# Patient Record
Sex: MALE | Race: WHITE | NOT HISPANIC OR LATINO | Employment: OTHER | URBAN - METROPOLITAN AREA
[De-identification: names, ages, dates, MRNs, and addresses within clinical notes are randomized per-mention and may not be internally consistent; named-entity substitution may affect disease eponyms.]

---

## 2019-09-10 ENCOUNTER — CATARACT CONSULT (OUTPATIENT)
Dept: URBAN - METROPOLITAN AREA CLINIC 43 | Facility: CLINIC | Age: 64
End: 2019-09-10

## 2019-09-10 DIAGNOSIS — H25.811: ICD-10-CM

## 2019-09-10 DIAGNOSIS — H25.812: ICD-10-CM

## 2019-09-10 DIAGNOSIS — H35.413: ICD-10-CM

## 2019-09-10 DIAGNOSIS — H18.51: ICD-10-CM

## 2019-09-10 DIAGNOSIS — H40.013: ICD-10-CM

## 2019-09-10 DIAGNOSIS — H04.123: ICD-10-CM

## 2019-09-10 PROCEDURE — 99204 OFFICE O/P NEW MOD 45 MIN: CPT

## 2019-09-10 PROCEDURE — 92025-2 CORNEAL TOPOGRAPHY, PT

## 2019-09-10 PROCEDURE — 92134 CPTRZ OPH DX IMG PST SGM RTA: CPT

## 2019-09-10 PROCEDURE — V2799PMN IMPRIMIS PRED-MOXI-NEPAF 5ML

## 2019-09-10 PROCEDURE — 92286 ANT SGM IMG I&R SPECLR MIC: CPT

## 2019-09-10 PROCEDURE — 92136TC50 INTERFEROMETRY - TECHNICAL COMPONENT

## 2019-09-10 ASSESSMENT — VISUAL ACUITY
OS_CC: J1
OD_BAT: <20/400
OS_CC: 20/25-3
OS_BAT: <20/400
OD_CC: J8
OD_RAM: 20/20-2
OS_SC: <J12
OS_AM: 20/20-2
OD_CC: 20/50-2
OD_SC: 20/200
OD_SC: <J12
OS_SC: 20/200

## 2019-09-10 ASSESSMENT — TONOMETRY
OD_IOP_MMHG: 15
OS_IOP_MMHG: 15

## 2019-09-19 ENCOUNTER — TECH ONLY (OUTPATIENT)
Dept: URBAN - METROPOLITAN AREA CLINIC 39 | Facility: CLINIC | Age: 64
End: 2019-09-19

## 2019-09-19 DIAGNOSIS — H25.812: ICD-10-CM

## 2019-09-19 DIAGNOSIS — H25.811: ICD-10-CM

## 2019-09-19 PROCEDURE — 92136TCNC INTERFEROMETRY -TECHNICAL COMPONENT NO CHARGE

## 2019-09-23 ENCOUNTER — SURGERY/PROCEDURE (OUTPATIENT)
Dept: URBAN - METROPOLITAN AREA CLINIC 43 | Facility: CLINIC | Age: 64
End: 2019-09-23

## 2019-09-23 ENCOUNTER — PRE-OP/H&P (OUTPATIENT)
Dept: URBAN - METROPOLITAN AREA CLINIC 39 | Facility: CLINIC | Age: 64
End: 2019-09-23

## 2019-09-23 ENCOUNTER — POST-OP (OUTPATIENT)
Dept: URBAN - METROPOLITAN AREA CLINIC 39 | Facility: CLINIC | Age: 64
End: 2019-09-23

## 2019-09-23 DIAGNOSIS — H25.811: ICD-10-CM

## 2019-09-23 DIAGNOSIS — H25.812: ICD-10-CM

## 2019-09-23 DIAGNOSIS — H35.413: ICD-10-CM

## 2019-09-23 DIAGNOSIS — H18.51: ICD-10-CM

## 2019-09-23 DIAGNOSIS — H40.013: ICD-10-CM

## 2019-09-23 DIAGNOSIS — Z96.1: ICD-10-CM

## 2019-09-23 DIAGNOSIS — H04.123: ICD-10-CM

## 2019-09-23 PROCEDURE — 99211T TECH SERVICE

## 2019-09-23 PROCEDURE — 66984AV REMOVE CATARACT, INSERT ADVANCED LENS

## 2019-09-23 PROCEDURE — 66999LNSR LENSAR LASER FOR CAT SX

## 2019-09-23 PROCEDURE — 65772LRI LRI DURING CAT SX

## 2019-09-23 ASSESSMENT — VISUAL ACUITY
OD_PH: 20/60
OD_SC: 20/200

## 2019-09-23 ASSESSMENT — TONOMETRY: OD_IOP_MMHG: 10

## 2019-10-01 ENCOUNTER — POST-OP CATARACT (OUTPATIENT)
Dept: URBAN - METROPOLITAN AREA CLINIC 43 | Facility: CLINIC | Age: 64
End: 2019-10-01

## 2019-10-01 DIAGNOSIS — H25.812: ICD-10-CM

## 2019-10-01 DIAGNOSIS — Z96.1: ICD-10-CM

## 2019-10-01 PROCEDURE — 99024 POSTOP FOLLOW-UP VISIT: CPT

## 2019-10-01 ASSESSMENT — TONOMETRY
OD_IOP_MMHG: 16
OS_IOP_MMHG: 16

## 2019-10-01 ASSESSMENT — VISUAL ACUITY
OS_CC: 20/30
OD_SC: 20/70-2
OD_PH: 20/50-2

## 2019-10-07 ENCOUNTER — POST-OP (OUTPATIENT)
Dept: URBAN - METROPOLITAN AREA CLINIC 39 | Facility: CLINIC | Age: 64
End: 2019-10-07

## 2019-10-07 ENCOUNTER — SURGERY/PROCEDURE (OUTPATIENT)
Dept: URBAN - METROPOLITAN AREA SURGERY 14 | Facility: SURGERY | Age: 64
End: 2019-10-07

## 2019-10-07 DIAGNOSIS — H26.491: ICD-10-CM

## 2019-10-07 DIAGNOSIS — H25.812: ICD-10-CM

## 2019-10-07 DIAGNOSIS — Z96.1: ICD-10-CM

## 2019-10-07 PROCEDURE — 66821 AFTER CATARACT LASER SURGERY: CPT

## 2019-10-07 PROCEDURE — 99024 POSTOP FOLLOW-UP VISIT: CPT

## 2019-10-07 ASSESSMENT — VISUAL ACUITY
OD_SC: 20/40
OD_SC: J3 @ 14"

## 2019-10-14 ENCOUNTER — POST-OP (OUTPATIENT)
Dept: URBAN - METROPOLITAN AREA CLINIC 39 | Facility: CLINIC | Age: 64
End: 2019-10-14

## 2019-10-14 DIAGNOSIS — Z96.1: ICD-10-CM

## 2019-10-14 PROCEDURE — 99024 POSTOP FOLLOW-UP VISIT: CPT

## 2019-10-14 ASSESSMENT — VISUAL ACUITY
OS_CC: 20/20-3
OD_SC: 20/40-1
OD_SC: J3

## 2019-10-14 ASSESSMENT — TONOMETRY
OD_IOP_MMHG: 14
OS_IOP_MMHG: 14

## 2019-10-16 ENCOUNTER — POST-OP (OUTPATIENT)
Dept: URBAN - METROPOLITAN AREA CLINIC 39 | Facility: CLINIC | Age: 64
End: 2019-10-16

## 2019-10-16 ENCOUNTER — SURGERY/PROCEDURE (OUTPATIENT)
Dept: URBAN - METROPOLITAN AREA CLINIC 43 | Facility: CLINIC | Age: 64
End: 2019-10-16

## 2019-10-16 ENCOUNTER — PRE-OP/H&P (OUTPATIENT)
Dept: URBAN - METROPOLITAN AREA CLINIC 39 | Facility: CLINIC | Age: 64
End: 2019-10-16

## 2019-10-16 DIAGNOSIS — H52.7: ICD-10-CM

## 2019-10-16 DIAGNOSIS — Z98.890: ICD-10-CM

## 2019-10-16 DIAGNOSIS — Z96.1: ICD-10-CM

## 2019-10-16 DIAGNOSIS — H25.812: ICD-10-CM

## 2019-10-16 PROCEDURE — 99211T TECH SERVICE

## 2019-10-16 PROCEDURE — 65772 CORRECTION OF ASTIGMATISM: CPT

## 2019-10-16 PROCEDURE — 66984AV REMOVE CATARACT, INSERT ADVANCED LENS

## 2019-10-16 PROCEDURE — 66999LNSR LENSAR LASER FOR CAT SX

## 2019-10-16 ASSESSMENT — VISUAL ACUITY
OD_SC: J3
OS_SC: 20/50
OD_SC: 20/30-2
OD_SC: 20/40

## 2019-10-16 ASSESSMENT — TONOMETRY
OD_IOP_MMHG: 12
OS_IOP_MMHG: 14

## 2019-10-22 ENCOUNTER — POST-OP (OUTPATIENT)
Dept: URBAN - METROPOLITAN AREA CLINIC 43 | Facility: CLINIC | Age: 64
End: 2019-10-22

## 2019-10-22 DIAGNOSIS — Z96.1: ICD-10-CM

## 2019-10-22 DIAGNOSIS — H18.51: ICD-10-CM

## 2019-10-22 DIAGNOSIS — Z98.890: ICD-10-CM

## 2019-10-22 PROCEDURE — 92025 CPTRIZED CORNEAL TOPOGRAPHY: CPT

## 2019-10-22 PROCEDURE — 99024 POSTOP FOLLOW-UP VISIT: CPT

## 2019-10-22 ASSESSMENT — VISUAL ACUITY
OS_SC: J3 @ 23"
OD: J6 @ 23"
OU_SC: 20/20-1
OU_SC: J1 @ 16"
OS_SC: 20/25
OD_SC: J1 @ 16"

## 2020-09-03 NOTE — PATIENT DISCUSSION
GLAUCOMA:  I have talked with the patient about my impressions, explained our treatment plan, and have answered all questions and patient understands.   Patient to follow up MAy/June - VF OU

## 2021-05-28 NOTE — PATIENT DISCUSSION
GLAUCOMA:  I have talked with the patient about my impressions, explained our treatment plan, and have answered all questions and patient understands.

## 2021-12-02 NOTE — PATIENT DISCUSSION
No treatment at present. Note that he has had to wait a long time both times please don't make him wait.

## 2022-06-09 NOTE — PATIENT DISCUSSION
IOP stable and at target. VF WNL. He prefers to continue care with a Glaucoma Specialist. Discussed Dr. Maisha Livingston. Patient wishes to drive to [facility] to establish care.